# Patient Record
Sex: MALE | Race: WHITE | Employment: OTHER | URBAN - NONMETROPOLITAN AREA
[De-identification: names, ages, dates, MRNs, and addresses within clinical notes are randomized per-mention and may not be internally consistent; named-entity substitution may affect disease eponyms.]

---

## 2018-11-29 ENCOUNTER — HOSPITAL ENCOUNTER (OUTPATIENT)
Dept: INFUSION THERAPY | Age: 83
Discharge: HOME | End: 2018-11-29
Payer: MEDICARE

## 2018-11-29 ENCOUNTER — OFFICE VISIT (OUTPATIENT)
Dept: ONCOLOGY | Age: 83
End: 2018-11-29
Payer: MEDICARE

## 2018-11-29 VITALS
OXYGEN SATURATION: 92 % | HEART RATE: 60 BPM | WEIGHT: 153.4 LBS | HEIGHT: 70 IN | TEMPERATURE: 98.1 F | SYSTOLIC BLOOD PRESSURE: 186 MMHG | BODY MASS INDEX: 21.96 KG/M2 | DIASTOLIC BLOOD PRESSURE: 93 MMHG | RESPIRATION RATE: 18 BRPM

## 2018-11-29 DIAGNOSIS — D47.2 MGUS (MONOCLONAL GAMMOPATHY OF UNKNOWN SIGNIFICANCE): Primary | ICD-10-CM

## 2018-11-29 DIAGNOSIS — D47.2 MGUS (MONOCLONAL GAMMOPATHY OF UNKNOWN SIGNIFICANCE): ICD-10-CM

## 2018-11-29 LAB
ALBUMIN SERPL-MCNC: 4.2 G/DL (ref 3.5–5.1)
ALP BLD-CCNC: 125 U/L (ref 38–126)
ALT SERPL-CCNC: 23 U/L (ref 11–66)
AST SERPL-CCNC: 28 U/L (ref 5–40)
BASINOPHIL, AUTOMATED: 0 % (ref 0–3)
BILIRUB SERPL-MCNC: 0.4 MG/DL (ref 0.3–1.2)
BILIRUBIN DIRECT: < 0.2 MG/DL (ref 0–0.3)
BUN, WHOLE BLOOD: 37 MG/DL (ref 8–26)
CALCIUM SERPL-MCNC: 9.4 MG/DL (ref 8.5–10.5)
CHLORIDE, WHOLE BLOOD: 96 MEQ/L (ref 98–109)
CREATININE, WHOLE BLOOD: 4.2 MG/DL (ref 0.5–1.2)
EOSINOPHILS RELATIVE PERCENT: 2 % (ref 0–4)
GFR, ESTIMATED ,CON: 14 ML/MIN/1.73M2
GLUCOSE, WHOLE BLOOD: 142 MG/DL (ref 70–108)
HCT VFR BLD CALC: 33.7 % (ref 42–52)
HEMOGLOBIN: 11 GM/DL (ref 14–18)
IONIZED CALCIUM, WHOLE BLOOD: 1.05 MMOL/L (ref 1.12–1.32)
LYMPHOCYTES # BLD: 19 % (ref 15–47)
MCH RBC QN AUTO: 32.6 PG (ref 27–31)
MCHC RBC AUTO-ENTMCNC: 32.7 GM/DL (ref 33–37)
MCV RBC AUTO: 99 FL (ref 80–94)
MONOCYTES: 7 % (ref 0–12)
PDW BLD-RTO: 12.5 % (ref 11.5–14.5)
PLATELET # BLD: 199 THOU/MM3 (ref 130–400)
PMV BLD AUTO: 8.5 FL (ref 7.4–10.4)
POTASSIUM, WHOLE BLOOD: 4.1 MEQ/L (ref 3.5–4.9)
RBC # BLD: 3.38 MILL/MM3 (ref 4.7–6.1)
SEDIMENTATION RATE, ERYTHROCYTE: 42 MM/HR (ref 0–10)
SEG NEUTROPHILS: 72 % (ref 43–75)
SODIUM, WHOLE BLOOD: 139 MEQ/L (ref 138–146)
TOTAL CO2, WHOLE BLOOD: 30 MEQ/L (ref 23–33)
TOTAL PROTEIN: 7.8 G/DL (ref 6.1–8)
WBC # BLD: 6.5 THOU/MM3 (ref 4.8–10.8)

## 2018-11-29 PROCEDURE — 85025 COMPLETE CBC W/AUTO DIFF WBC: CPT

## 2018-11-29 PROCEDURE — 99211 OFF/OP EST MAY X REQ PHY/QHP: CPT

## 2018-11-29 PROCEDURE — 99203 OFFICE O/P NEW LOW 30 MIN: CPT | Performed by: INTERNAL MEDICINE

## 2018-11-29 PROCEDURE — 82310 ASSAY OF CALCIUM: CPT

## 2018-11-29 PROCEDURE — 83883 ASSAY NEPHELOMETRY NOT SPEC: CPT

## 2018-11-29 PROCEDURE — 80076 HEPATIC FUNCTION PANEL: CPT

## 2018-11-29 PROCEDURE — 84165 PROTEIN E-PHORESIS SERUM: CPT

## 2018-11-29 PROCEDURE — 80047 BASIC METABLC PNL IONIZED CA: CPT

## 2018-11-29 PROCEDURE — 4004F PT TOBACCO SCREEN RCVD TLK: CPT | Performed by: INTERNAL MEDICINE

## 2018-11-29 PROCEDURE — 36415 COLL VENOUS BLD VENIPUNCTURE: CPT

## 2018-11-29 PROCEDURE — G8420 CALC BMI NORM PARAMETERS: HCPCS | Performed by: INTERNAL MEDICINE

## 2018-11-29 PROCEDURE — 1123F ACP DISCUSS/DSCN MKR DOCD: CPT | Performed by: INTERNAL MEDICINE

## 2018-11-29 PROCEDURE — G8484 FLU IMMUNIZE NO ADMIN: HCPCS | Performed by: INTERNAL MEDICINE

## 2018-11-29 PROCEDURE — 86334 IMMUNOFIX E-PHORESIS SERUM: CPT

## 2018-11-29 PROCEDURE — 4040F PNEUMOC VAC/ADMIN/RCVD: CPT | Performed by: INTERNAL MEDICINE

## 2018-11-29 PROCEDURE — 1101F PT FALLS ASSESS-DOCD LE1/YR: CPT | Performed by: INTERNAL MEDICINE

## 2018-11-29 PROCEDURE — 85651 RBC SED RATE NONAUTOMATED: CPT

## 2018-11-29 PROCEDURE — G8427 DOCREV CUR MEDS BY ELIG CLIN: HCPCS | Performed by: INTERNAL MEDICINE

## 2018-11-29 PROCEDURE — 84160 ASSAY OF PROTEIN ANY SOURCE: CPT

## 2018-11-29 PROCEDURE — 82784 ASSAY IGA/IGD/IGG/IGM EACH: CPT

## 2018-11-29 RX ORDER — ISOSORBIDE MONONITRATE 30 MG/1
30 TABLET, EXTENDED RELEASE ORAL DAILY
COMMUNITY

## 2018-11-29 RX ORDER — WARFARIN SODIUM 2.5 MG/1
2.5 TABLET ORAL
COMMUNITY

## 2018-11-29 RX ORDER — PRAVASTATIN SODIUM 20 MG
20 TABLET ORAL DAILY
COMMUNITY

## 2018-11-29 RX ORDER — CETIRIZINE HYDROCHLORIDE 10 MG/1
10 TABLET ORAL DAILY
COMMUNITY

## 2018-11-29 RX ORDER — LIOTHYRONINE SODIUM 5 UG/1
5 TABLET ORAL 2 TIMES DAILY
COMMUNITY

## 2018-11-29 RX ORDER — LOSARTAN POTASSIUM 50 MG/1
50 TABLET ORAL DAILY
COMMUNITY

## 2018-11-29 RX ORDER — MONTELUKAST SODIUM 10 MG/1
10 TABLET ORAL NIGHTLY
COMMUNITY

## 2018-12-03 LAB
IMMUNOFIXATION WITH QUANT: NORMAL
KAPPA/LAMBDA FREE LIGHT CHAINS: NORMAL

## 2018-12-08 NOTE — PROGRESS NOTES
[facility]  [facility] OF [facility]  [facility] [address] [facility] [address]  [address] [facility] [address] [facility] [address]  Dept: [phone]  Dept Fax: [phone]  Loc: [phone]    Subjective:      Chief Complaint: Marely De La Paz is a 80 y.o. male with a monoclonal protein. HPI: This is the first visit to the [facility] for this patient who was referred for evaluation of a monoclonal protein. The patient is an 80-year-old male with multiple medical problems including the chronic kidney disease, chronic dialysis therapy, diabetes mellitus, cardiovascular disease including a history of myocardial infarction, thyroid disease, and prostate cancer. He has had prostate surgery in 1996 for his malignancy. He has had no evidence recurrent malignancy since that time. He has also underwent coronary artery bypass grafting. The patient is a  and receives medical care at the [facility]. Recent laboratory studies he was noted to have an elevated light chain As well as an elevated light chain lambda. The ratio of Kappa to  lambda light chains was increased. She is unaware of any previous difficulties with classical disease. He has no known history of prior elevations in his light chain proteins. Patient has multiple complaints including fatigue, weight loss, cough, depression, anxiety, shortness of breath, joint pain and chest pain. Past Medical History  He has a past medical history of Bladder spasms; Bleeds easily ([address] Bruises easily; CAD (coronary artery disease); Cancer ([address] Cataract; Chronic kidney disease; Chronic UTI; Diabetes mellitus ([address] MI (myocardial infarction) ([address] Osteoarthritis; Presence of suprapubic catheter ([address] Prostate cancer ([address] Scoliosis; and Thyroid disease.     Surgical History  He has a past surgical history that includes Coronary artery bypass graft (1990); Cardiac catheterization; hip surgery; Carpal tunnel release (Left); lumbar fusion; and Prostate surgery (1996). Home Medications  He has a current medication list which includes the following prescription(s): losartan, liothyronine, pravastatin, isosorbide mononitrate, vitamin d3, cetirizine, montelukast, mirabegron er, warfarin, LIDOCAINE, LIDODERM, 5% PATCH AND REMOVAL, metoprolol tartrate, levothyroxine, amlodipine, ranitidine, insulin detemir, folic acid, aspirin, nitroglycerin, budesonide-formoterol, vitamin c, calcium, polyethylene glycol, clopidogrel, ranolazine, simvastatin, hydralazine, loratadine, cephalexin, oxybutynin, albuterol sulfate hfa, spirulina, torsemide, and carvedilol. Allergies  Allergies   Allergen Reactions    Sulfa Antibiotics Other (See Comments)     DUE TO KIDNEY DISEASE     Social History  He reports that he has never smoked. His smokeless tobacco use includes Snuff. He reports that he does not drink alcohol or use drugs. Family History  He family history includes Lung Cancer in his brother. Review of Systems  Constitutional: Fatigue, weight loss. HENT: Hearing loss. Eyes: Vision changes. Respiratory: Dyspnea, chest pain, . Cardiovascular: Negative. Gastrointestinal: Negative. Genitourinary: Negative. Musculoskeletal: Joint pain. Skin: Negative. Neurological: General weakness. Hematological: Negative. Psychiatric/Behavioral: Anxiety, depression. Objective:   Physical Exam  Vitals:    11/29/18 1052   BP: (!) 186/93   Pulse:    Resp:    Temp:    SpO2:    Vitals reviewed and are stable. Constitutional: Elderly, frail. No acute distress. HENT: Normocephalic and atraumatic. Eyes: Pupils are equal and reactive. No scleral icterus. Neck: Overall appearance is symmetrical. No identifiable masses. Chest: Inspection and palpation of chest is normal.  Pulmonary: Effort normal. No respiratory distress.    Cardiovascular: Regular rate and rhythm